# Patient Record
Sex: MALE | Race: WHITE | Employment: FULL TIME | URBAN - METROPOLITAN AREA
[De-identification: names, ages, dates, MRNs, and addresses within clinical notes are randomized per-mention and may not be internally consistent; named-entity substitution may affect disease eponyms.]

---

## 2017-01-30 ENCOUNTER — ANESTHESIA EVENT (OUTPATIENT)
Dept: SURGERY | Facility: HOSPITAL | Age: 57
End: 2017-01-30
Payer: COMMERCIAL

## 2017-01-30 ENCOUNTER — ANESTHESIA (OUTPATIENT)
Dept: SURGERY | Facility: HOSPITAL | Age: 57
End: 2017-01-30
Payer: COMMERCIAL

## 2017-01-30 ENCOUNTER — SURGERY (OUTPATIENT)
Age: 57
End: 2017-01-30

## 2017-01-30 ENCOUNTER — HOSPITAL ENCOUNTER (OUTPATIENT)
Facility: HOSPITAL | Age: 57
Setting detail: HOSPITAL OUTPATIENT SURGERY
Discharge: HOME | End: 2017-01-30
Attending: ORTHOPAEDIC SURGERY | Admitting: ORTHOPAEDIC SURGERY
Payer: COMMERCIAL

## 2017-01-30 VITALS
TEMPERATURE: 97 F | HEIGHT: 67 IN | DIASTOLIC BLOOD PRESSURE: 78 MMHG | RESPIRATION RATE: 16 BRPM | HEART RATE: 62 BPM | SYSTOLIC BLOOD PRESSURE: 156 MMHG | WEIGHT: 223 LBS | BODY MASS INDEX: 35 KG/M2 | OXYGEN SATURATION: 98 %

## 2017-01-30 DIAGNOSIS — G56.02 LEFT CARPAL TUNNEL SYNDROME: Primary | ICD-10-CM

## 2017-01-30 DIAGNOSIS — G56.22 CUBITAL TUNNEL SYNDROME, LEFT: ICD-10-CM

## 2017-01-30 LAB
GLUCOSE BLDC GLUCOMTR-MCNC: 183 MG/DL (ref 70–99)
GLUCOSE BLDC GLUCOMTR-MCNC: 222 MG/DL (ref 70–99)

## 2017-01-30 PROCEDURE — 82962 GLUCOSE BLOOD TEST: CPT

## 2017-01-30 PROCEDURE — 01N50ZZ RELEASE MEDIAN NERVE, OPEN APPROACH: ICD-10-PCS | Performed by: ORTHOPAEDIC SURGERY

## 2017-01-30 PROCEDURE — 01N40ZZ RELEASE ULNAR NERVE, OPEN APPROACH: ICD-10-PCS | Performed by: ORTHOPAEDIC SURGERY

## 2017-01-30 RX ORDER — HYDROCODONE BITARTRATE AND ACETAMINOPHEN 5; 325 MG/1; MG/1
1 TABLET ORAL AS NEEDED
Status: DISCONTINUED | OUTPATIENT
Start: 2017-01-30 | End: 2017-01-30

## 2017-01-30 RX ORDER — GLYCOPYRROLATE 0.2 MG/ML
INJECTION INTRAMUSCULAR; INTRAVENOUS AS NEEDED
Status: DISCONTINUED | OUTPATIENT
Start: 2017-01-30 | End: 2017-01-30 | Stop reason: SURG

## 2017-01-30 RX ORDER — HYDROCODONE BITARTRATE AND ACETAMINOPHEN 5; 325 MG/1; MG/1
2 TABLET ORAL AS NEEDED
Status: DISCONTINUED | OUTPATIENT
Start: 2017-01-30 | End: 2017-01-30

## 2017-01-30 RX ORDER — FAMOTIDINE 20 MG/1
20 TABLET ORAL ONCE
Status: COMPLETED | OUTPATIENT
Start: 2017-01-30 | End: 2017-01-30

## 2017-01-30 RX ORDER — ONDANSETRON 2 MG/ML
4 INJECTION INTRAMUSCULAR; INTRAVENOUS ONCE AS NEEDED
Status: DISCONTINUED | OUTPATIENT
Start: 2017-01-30 | End: 2017-01-30

## 2017-01-30 RX ORDER — HYDROMORPHONE HYDROCHLORIDE 1 MG/ML
0.6 INJECTION, SOLUTION INTRAMUSCULAR; INTRAVENOUS; SUBCUTANEOUS EVERY 5 MIN PRN
Status: DISCONTINUED | OUTPATIENT
Start: 2017-01-30 | End: 2017-01-30

## 2017-01-30 RX ORDER — ACETAMINOPHEN 325 MG/1
650 TABLET ORAL ONCE
Status: COMPLETED | OUTPATIENT
Start: 2017-01-30 | End: 2017-01-30

## 2017-01-30 RX ORDER — ONDANSETRON 2 MG/ML
INJECTION INTRAMUSCULAR; INTRAVENOUS AS NEEDED
Status: DISCONTINUED | OUTPATIENT
Start: 2017-01-30 | End: 2017-01-30 | Stop reason: SURG

## 2017-01-30 RX ORDER — MORPHINE SULFATE 10 MG/ML
6 INJECTION, SOLUTION INTRAMUSCULAR; INTRAVENOUS EVERY 10 MIN PRN
Status: DISCONTINUED | OUTPATIENT
Start: 2017-01-30 | End: 2017-01-30

## 2017-01-30 RX ORDER — LIDOCAINE HYDROCHLORIDE 20 MG/ML
INJECTION, SOLUTION EPIDURAL; INFILTRATION; INTRACAUDAL; PERINEURAL AS NEEDED
Status: DISCONTINUED | OUTPATIENT
Start: 2017-01-30 | End: 2017-01-30 | Stop reason: HOSPADM

## 2017-01-30 RX ORDER — MIDAZOLAM HYDROCHLORIDE 1 MG/ML
INJECTION INTRAMUSCULAR; INTRAVENOUS AS NEEDED
Status: DISCONTINUED | OUTPATIENT
Start: 2017-01-30 | End: 2017-01-30 | Stop reason: SURG

## 2017-01-30 RX ORDER — MORPHINE SULFATE 4 MG/ML
4 INJECTION, SOLUTION INTRAMUSCULAR; INTRAVENOUS EVERY 10 MIN PRN
Status: DISCONTINUED | OUTPATIENT
Start: 2017-01-30 | End: 2017-01-30

## 2017-01-30 RX ORDER — NALOXONE HYDROCHLORIDE 0.4 MG/ML
80 INJECTION, SOLUTION INTRAMUSCULAR; INTRAVENOUS; SUBCUTANEOUS AS NEEDED
Status: DISCONTINUED | OUTPATIENT
Start: 2017-01-30 | End: 2017-01-30

## 2017-01-30 RX ORDER — OXYCODONE HYDROCHLORIDE AND ACETAMINOPHEN 5; 325 MG/1; MG/1
1 TABLET ORAL EVERY 6 HOURS PRN
Qty: 40 TABLET | Refills: 0 | Status: SHIPPED | OUTPATIENT
Start: 2017-01-30 | End: 2017-04-04

## 2017-01-30 RX ORDER — HYDROMORPHONE HYDROCHLORIDE 1 MG/ML
0.4 INJECTION, SOLUTION INTRAMUSCULAR; INTRAVENOUS; SUBCUTANEOUS EVERY 5 MIN PRN
Status: DISCONTINUED | OUTPATIENT
Start: 2017-01-30 | End: 2017-01-30

## 2017-01-30 RX ORDER — HYDROMORPHONE HYDROCHLORIDE 1 MG/ML
0.2 INJECTION, SOLUTION INTRAMUSCULAR; INTRAVENOUS; SUBCUTANEOUS EVERY 5 MIN PRN
Status: DISCONTINUED | OUTPATIENT
Start: 2017-01-30 | End: 2017-01-30

## 2017-01-30 RX ORDER — MORPHINE SULFATE 2 MG/ML
2 INJECTION, SOLUTION INTRAMUSCULAR; INTRAVENOUS EVERY 10 MIN PRN
Status: DISCONTINUED | OUTPATIENT
Start: 2017-01-30 | End: 2017-01-30

## 2017-01-30 RX ORDER — SODIUM CHLORIDE, SODIUM LACTATE, POTASSIUM CHLORIDE, CALCIUM CHLORIDE 600; 310; 30; 20 MG/100ML; MG/100ML; MG/100ML; MG/100ML
INJECTION, SOLUTION INTRAVENOUS CONTINUOUS
Status: DISCONTINUED | OUTPATIENT
Start: 2017-01-30 | End: 2017-01-30

## 2017-01-30 RX ORDER — LIDOCAINE HYDROCHLORIDE 10 MG/ML
INJECTION, SOLUTION EPIDURAL; INFILTRATION; INTRACAUDAL; PERINEURAL AS NEEDED
Status: DISCONTINUED | OUTPATIENT
Start: 2017-01-30 | End: 2017-01-30 | Stop reason: SURG

## 2017-01-30 RX ORDER — SODIUM CHLORIDE, SODIUM LACTATE, POTASSIUM CHLORIDE, CALCIUM CHLORIDE 600; 310; 30; 20 MG/100ML; MG/100ML; MG/100ML; MG/100ML
INJECTION, SOLUTION INTRAVENOUS CONTINUOUS PRN
Status: DISCONTINUED | OUTPATIENT
Start: 2017-01-30 | End: 2017-01-30 | Stop reason: SURG

## 2017-01-30 RX ORDER — DEXAMETHASONE SODIUM PHOSPHATE 4 MG/ML
VIAL (ML) INJECTION AS NEEDED
Status: DISCONTINUED | OUTPATIENT
Start: 2017-01-30 | End: 2017-01-30 | Stop reason: SURG

## 2017-01-30 RX ADMIN — GLYCOPYRROLATE 0.2 MG: 0.2 INJECTION INTRAMUSCULAR; INTRAVENOUS at 07:42:00

## 2017-01-30 RX ADMIN — LIDOCAINE HYDROCHLORIDE 50 MG: 10 INJECTION, SOLUTION EPIDURAL; INFILTRATION; INTRACAUDAL; PERINEURAL at 07:49:00

## 2017-01-30 RX ADMIN — SODIUM CHLORIDE, SODIUM LACTATE, POTASSIUM CHLORIDE, CALCIUM CHLORIDE: 600; 310; 30; 20 INJECTION, SOLUTION INTRAVENOUS at 09:24:00

## 2017-01-30 RX ADMIN — MIDAZOLAM HYDROCHLORIDE 2 MG: 1 INJECTION INTRAMUSCULAR; INTRAVENOUS at 07:42:00

## 2017-01-30 RX ADMIN — ONDANSETRON 4 MG: 2 INJECTION INTRAMUSCULAR; INTRAVENOUS at 07:55:00

## 2017-01-30 RX ADMIN — SODIUM CHLORIDE, SODIUM LACTATE, POTASSIUM CHLORIDE, CALCIUM CHLORIDE: 600; 310; 30; 20 INJECTION, SOLUTION INTRAVENOUS at 07:42:00

## 2017-01-30 RX ADMIN — DEXAMETHASONE SODIUM PHOSPHATE 4 MG: 4 MG/ML VIAL (ML) INJECTION at 07:55:00

## 2017-01-30 NOTE — OPERATIVE REPORT
Operative Note    Patient Name: Laraine Cowden    Preoperative Diagnosis: Left carpal tunnel syndrome, left cubital tunnel syndrome     Postoperative Diagnosis: Left carpal tunnel syndrome, left cubital tunnel syndrome     Primary Surgeon: Zach Torrez

## 2017-01-30 NOTE — ANESTHESIA POSTPROCEDURE EVALUATION
Patient: Raymundo Hill    Procedure Summary     Date Anesthesia Start Anesthesia Stop Room / Location    01/30/17 0742 0932 [facility] MAIN OR 05 / [facility] MAIN OR       Procedure Diagnosis Surgeon Responsible Provider    WRIST CARPAL TUNNEL RELEASE (Left Hand)

## 2017-01-30 NOTE — H&P
ORTHO SURGERY H&P  Lainey Turk is a 61year old male. MRN is [id number]. Admitted: (Not on file)    CC: Left hand weakness and numbness    HPI: Mr. Norris Cain is a 61year old male who presents complaining of left hand weakness and numbness.  He feel tablets, one tablet PO BID  Escitalopram- 10 mg tablets, one tablet PO daily  Lantus- Inject 30 units subQ every night  Lyrica- 50 mg capsules, one capsule PO BID  Vitamin D3  Zyrtec     Review Of Systems:     HEENT: Reports corrective lenses.  Otherwise ne electrodiagnostic testing. Dr. Lorna Caba advised him to see Dr. Víctor Pablo for a re-evaluation of his cervical spine. He may require imaging of his neck.  As far as his peripheral nerve entrapment syndromes are concerned, Dr. Lorna Caba advised consideration of

## 2017-01-30 NOTE — OPERATIVE REPORT
[facility]    PATIENT'S NAME: Bradley Salinas   ATTENDING PHYSICIAN: Oleksandr Aguirre MD   OPERATING PHYSICIAN: Oleksandr Aguirre MD   PATIENT ACCOUNT#:   [id number]    LOCATION:  [place] [address]  MEDICAL RECORD #:   [id number] The appropriate consents were obtained. He was taken to the operating room and placed in the supine position on the operating room table. After adequate general anesthesia was obtained, a tourniquet was placed on the left arm.   The left elbow and upper e ulnar to the thenar crease. We crossed the wrist flexion angle at a 45-degree angle. Blunt dissection was carried out to transverse carpal ligament. The ligament was incised proximally, distally.   I was able to pass a small finger under the remaining re

## 2017-01-30 NOTE — ANESTHESIA PREPROCEDURE EVALUATION
Anesthesia PreOp Note    HPI:     Cynthia Diaz is a 61year old male who presents for preoperative consultation requested by: Hannah Mahajan MD    Date of Surgery: 1/30/2017    Procedure(s):  WRIST CARPAL TUNNEL RELEASE  Indication: Left carpal cervical intervertebral disc    • Diabetes (HCC)    • Hearing impairment      LEFT EAR [place]   • Gout    • Osteoarthritis            Past Surgical History    OTHER      Comment cervical laminectomy    OTHER      Comment right shoulder 1980    CATH BARE METAL 0.05 MG/ML injection  Intravenous PRN Zoe Pack., CRNA 50 mcg at 01/30/17 8556   lactated ringers infusion  Intravenous Continuous PRN Unique Sanchez Number., CRNA    glycopyrrolate (ROBINUL) 0.2 MG/ML injection   PRN Zoe Pack., CRNA 0.2 mg 11/29/2016   K 4.7 11/29/2016    11/29/2016   CO2 25.0 11/29/2016   BUN 40* 11/29/2016   CREATSERUM 1.40* 11/29/2016   * 11/29/2016   CA 9.6 11/29/2016          Vital Signs:   height is 1.702 m (5' 7\") and weight is 101.152 kg (223 lb).  His o

## 2017-02-28 PROCEDURE — 80061 LIPID PANEL: CPT | Performed by: INTERNAL MEDICINE

## 2017-02-28 PROCEDURE — 83036 HEMOGLOBIN GLYCOSYLATED A1C: CPT | Performed by: INTERNAL MEDICINE

## 2017-02-28 PROCEDURE — 85652 RBC SED RATE AUTOMATED: CPT | Performed by: INTERNAL MEDICINE

## 2017-02-28 PROCEDURE — 36415 COLL VENOUS BLD VENIPUNCTURE: CPT | Performed by: INTERNAL MEDICINE

## 2017-02-28 PROCEDURE — 82570 ASSAY OF URINE CREATININE: CPT | Performed by: INTERNAL MEDICINE

## 2017-02-28 PROCEDURE — 82043 UR ALBUMIN QUANTITATIVE: CPT | Performed by: INTERNAL MEDICINE

## 2017-02-28 PROCEDURE — 80050 GENERAL HEALTH PANEL: CPT | Performed by: INTERNAL MEDICINE

## 2017-04-04 PROBLEM — M86.60 CHRONIC OSTEOMYELITIS (HCC): Status: ACTIVE | Noted: 2017-04-04

## 2017-04-04 PROBLEM — N18.4 CKD STAGE 4 DUE TO TYPE 2 DIABETES MELLITUS (HCC): Status: ACTIVE | Noted: 2017-04-04

## 2017-04-04 PROBLEM — E11.22 CKD STAGE 4 DUE TO TYPE 2 DIABETES MELLITUS (HCC): Status: ACTIVE | Noted: 2017-04-04

## 2017-04-04 PROCEDURE — 81001 URINALYSIS AUTO W/SCOPE: CPT | Performed by: INTERNAL MEDICINE

## 2017-04-04 PROCEDURE — 80061 LIPID PANEL: CPT | Performed by: INTERNAL MEDICINE

## 2017-04-04 PROCEDURE — 80053 COMPREHEN METABOLIC PANEL: CPT | Performed by: INTERNAL MEDICINE

## 2017-04-04 PROCEDURE — 85025 COMPLETE CBC W/AUTO DIFF WBC: CPT | Performed by: INTERNAL MEDICINE

## 2017-04-04 PROCEDURE — 36415 COLL VENOUS BLD VENIPUNCTURE: CPT | Performed by: INTERNAL MEDICINE

## 2017-04-04 PROCEDURE — 85652 RBC SED RATE AUTOMATED: CPT | Performed by: INTERNAL MEDICINE

## 2017-06-15 PROBLEM — D33.3 ACOUSTIC NEUROMA (HCC): Status: ACTIVE | Noted: 2017-06-15

## 2017-06-15 PROCEDURE — 36415 COLL VENOUS BLD VENIPUNCTURE: CPT | Performed by: INTERNAL MEDICINE

## 2017-06-15 PROCEDURE — 83036 HEMOGLOBIN GLYCOSYLATED A1C: CPT | Performed by: INTERNAL MEDICINE

## 2017-06-15 PROCEDURE — 80053 COMPREHEN METABOLIC PANEL: CPT | Performed by: INTERNAL MEDICINE

## 2017-06-15 PROCEDURE — 85025 COMPLETE CBC W/AUTO DIFF WBC: CPT | Performed by: INTERNAL MEDICINE

## 2018-02-01 PROBLEM — M86.60: Status: ACTIVE | Noted: 2017-04-04

## 2018-05-29 PROCEDURE — 81001 URINALYSIS AUTO W/SCOPE: CPT | Performed by: INTERNAL MEDICINE

## 2018-05-29 PROCEDURE — 82043 UR ALBUMIN QUANTITATIVE: CPT | Performed by: INTERNAL MEDICINE

## 2018-05-29 PROCEDURE — 82570 ASSAY OF URINE CREATININE: CPT | Performed by: INTERNAL MEDICINE

## 2019-08-29 ENCOUNTER — TELEPHONE (OUTPATIENT)
Dept: INTERNAL MEDICINE CLINIC | Facility: CLINIC | Age: 59
End: 2019-08-29

## 2019-08-29 NOTE — TELEPHONE ENCOUNTER
Please call patient to schedule annual OV then back to clinical for refill.  Pt not seen since 5/29/18

## 2019-08-29 NOTE — TELEPHONE ENCOUNTER
Pt requesting refill for:  Aspirin  Atorvastatin   Carvedilol  Clopidogrel  Tasked to [employer]

## 2019-12-19 ENCOUNTER — TELEPHONE (OUTPATIENT)
Dept: INTERNAL MEDICINE CLINIC | Facility: CLINIC | Age: 59
End: 2019-12-19

## 2019-12-19 NOTE — TELEPHONE ENCOUNTER
Received  12/18/2019 lab results from Dr. Brown He office, Glucose 471. Per Dr. Ra Rocha notes - pt needs eval in ER. LM for pt to call back. Nursing to FU.

## 2019-12-20 NOTE — TELEPHONE ENCOUNTER
Patient has not been seen by me since May 2018--over 1-1/2 years ago. I am not sure if he is following up with another primary care provider.   He does see his cardiologist regularly and these labs were obtained by cardiologist.  Would leave message with julian

## 2021-03-18 DIAGNOSIS — Z23 NEED FOR VACCINATION: ICD-10-CM

## (undated) DEVICE — COVER SGL STRL LGHT HNDL BLU

## (undated) DEVICE — UNDYED BRAIDED (POLYGLACTIN 910), SYNTHETIC ABSORBABLE SUTURE: Brand: COATED VICRYL

## (undated) DEVICE — OCCLUSIVE GAUZE STRIP OVERWRAP,3% BISMUTH TRIBROMOPHENATE IN PETROLATUM BLEND: Brand: XEROFORM

## (undated) DEVICE — INTENDED FOR TISSUE SEPARATION, AND OTHER PROCEDURES THAT REQUIRE A SHARP SURGICAL BLADE TO PUNCTURE OR CUT.: Brand: BARD-PARKER ® STAINLESS STEEL BLADES

## (undated) DEVICE — ZIMMER® STERILE DISPOSABLE TOURNIQUET CUFF WITH PLC, DUAL PORT, SINGLE BLADDER, 18 IN. (46 CM)

## (undated) DEVICE — CONMED ACCESSORY ELECTRODE, NEEDLE ELECTRODE

## (undated) DEVICE — SPECIALTY ARM SLING: Brand: DEROYAL

## (undated) DEVICE — INTENDED TO BE USED TO OCCLUDE, RETRACT AND IDENTIFY ARTERIES, VEINS, TENDONS AND NERVES IN SURGICAL PROCEDURES: Brand: STERION®  VESSEL LOOP

## (undated) DEVICE — COTTON UNDERCAST PADDING,REGULAR FINISH: Brand: WEBRIL

## (undated) DEVICE — SUTURE ETHILON 4-0 1667G

## (undated) DEVICE — STERILE LATEX POWDER-FREE SURGICAL GLOVESWITH NITRILE COATING: Brand: PROTEXIS

## (undated) DEVICE — REM POLYHESIVE ADULT PATIENT RETURN ELECTRODE: Brand: VALLEYLAB

## (undated) DEVICE — STERILE POLYISOPRENE POWDER-FREE SURGICAL GLOVES: Brand: PROTEXIS

## (undated) DEVICE — ZIMMER® STERILE DISPOSABLE TOURNIQUET CUFF WITH PLC, DUAL PORT, SINGLE BLADDER, 34 IN. (86 CM)

## (undated) DEVICE — SUCTION CANISTER, 3000CC,SAFELINER: Brand: DEROYAL

## (undated) DEVICE — OCCLUSIVE GAUZE STRIP,3% BISMUTH TRIBROMOPHENATE IN PETROLATUM BLEND: Brand: XEROFORM

## (undated) DEVICE — SUTURE VICRYL 2-0 FS-1

## (undated) DEVICE — UPPER EXTREMITY: Brand: MEDLINE INDUSTRIES, INC.

## (undated) DEVICE — CHLORAPREP 26ML APPLICATOR

## (undated) DEVICE — SUTURE ETHILON 3-0 669H

## (undated) DEVICE — SOL  .9 1000ML BTL

## (undated) NOTE — LETTER
12/23/2019              [facility] [place]        [address] [facility] [address]         Dear Evgeny Pedraza,    This letter is to inform you that our office has made several attempts to reach you by phone without success.   We were attempting

## (undated) NOTE — IP AVS SNAPSHOT
[facility]    [address], [place], [place] ~ [phone]                Discharge Summary   1/30/2017    Geeta Resides           Admission Information        Provider Department    1/30/2017 Chely Zarate MD [facility] · Do not drive any motor vehicle or bicycle   · Avoid mowing the lawn, playing sports, or working with power tools/applicances (power saws, electric knives or mixers)   · That you have someone stay with you on your first night home   · Do not drink alcohol Adrienne Sanchez and your surgeon are proudly participating in the [address] [place] [address] Surgeons \"National Surgical quality Improvement Program\" (NSQIP).   Questionnaires will be mailed to randomly selected surgery patients 30 days after their surger Contact information:    Argelia Avalos [address] [address] [phone]        Future Appointments        Provider Department    2/27/2017 5:00 PM Emmie, [address]; A MEMBER OF [employer] We are concerned for your overall well being:    - If you are a smoker or have smoked in the last 12 months, we encourage you to explore options for quitting.     - If you have concerns related to behavioral health issues or thoughts of harming yourself,